# Patient Record
Sex: MALE | Race: BLACK OR AFRICAN AMERICAN | NOT HISPANIC OR LATINO | Employment: OTHER | URBAN - METROPOLITAN AREA
[De-identification: names, ages, dates, MRNs, and addresses within clinical notes are randomized per-mention and may not be internally consistent; named-entity substitution may affect disease eponyms.]

---

## 2017-12-28 ENCOUNTER — HISTORICAL (OUTPATIENT)
Dept: ADMINISTRATIVE | Facility: HOSPITAL | Age: 82
End: 2017-12-28

## 2017-12-28 LAB
BUN SERPL-MCNC: 35 MG/DL (ref 7–18)
CALCIUM SERPL-MCNC: 8.7 MG/DL (ref 8.5–10.1)
CHLORIDE SERPL-SCNC: 108 MMOL/L (ref 98–107)
CO2 SERPL-SCNC: 20 MMOL/L (ref 21–32)
CREAT SERPL-MCNC: 1.85 MG/DL (ref 0.7–1.3)
GLUCOSE SERPL-MCNC: 139 MG/DL (ref 74–106)
POC CREATININE: 1.7 MG/DL (ref 0.6–1.3)
POTASSIUM SERPL-SCNC: 4.3 MMOL/L (ref 3.5–5.1)
SODIUM SERPL-SCNC: 141 MMOL/L (ref 136–145)

## 2018-06-14 ENCOUNTER — HISTORICAL (OUTPATIENT)
Dept: ADMINISTRATIVE | Facility: HOSPITAL | Age: 83
End: 2018-06-14

## 2018-06-14 LAB
ABS NEUT (OLG): 4.69 X10(3)/MCL (ref 2.1–9.2)
BASOPHILS # BLD AUTO: 0 X10(3)/MCL (ref 0–0.2)
BASOPHILS NFR BLD AUTO: 1 %
CRP SERPL HS-MCNC: 3.55 MG/L (ref 0–3)
EOSINOPHIL # BLD AUTO: 0.2 X10(3)/MCL (ref 0–0.9)
EOSINOPHIL NFR BLD AUTO: 2 %
ERYTHROCYTE [DISTWIDTH] IN BLOOD BY AUTOMATED COUNT: 14.6 % (ref 11.5–17)
ERYTHROCYTE [SEDIMENTATION RATE] IN BLOOD: 13 MM/HR (ref 0–15)
HCT VFR BLD AUTO: 37.3 % (ref 42–52)
HGB BLD-MCNC: 12.1 GM/DL (ref 14–18)
LYMPHOCYTES # BLD AUTO: 1.5 X10(3)/MCL (ref 0.6–4.6)
LYMPHOCYTES NFR BLD AUTO: 21 %
MCH RBC QN AUTO: 32.8 PG (ref 27–31)
MCHC RBC AUTO-ENTMCNC: 32.4 GM/DL (ref 33–36)
MCV RBC AUTO: 101.1 FL (ref 80–94)
MONOCYTES # BLD AUTO: 0.7 X10(3)/MCL (ref 0.1–1.3)
MONOCYTES NFR BLD AUTO: 10 %
NEUTROPHILS # BLD AUTO: 4.69 X10(3)/MCL (ref 1.4–7.9)
NEUTROPHILS NFR BLD AUTO: 66 %
PLATELET # BLD AUTO: 131 X10(3)/MCL (ref 130–400)
PMV BLD AUTO: 10.8 FL (ref 9.4–12.4)
RBC # BLD AUTO: 3.69 X10(6)/MCL (ref 4.7–6.1)
WBC # SPEC AUTO: 7.1 X10(3)/MCL (ref 4.5–11.5)

## 2021-05-12 ENCOUNTER — PATIENT MESSAGE (OUTPATIENT)
Dept: RESEARCH | Facility: HOSPITAL | Age: 86
End: 2021-05-12

## 2022-04-11 ENCOUNTER — HISTORICAL (OUTPATIENT)
Dept: ADMINISTRATIVE | Facility: HOSPITAL | Age: 87
End: 2022-04-11

## 2022-04-26 VITALS
DIASTOLIC BLOOD PRESSURE: 83 MMHG | SYSTOLIC BLOOD PRESSURE: 139 MMHG | HEIGHT: 71 IN | WEIGHT: 213.88 LBS | BODY MASS INDEX: 29.94 KG/M2

## 2023-03-23 LAB
CHOLEST SERPL-MSCNC: 126 MG/DL (ref 0–200)
HDLC SERPL-MCNC: 42 MG/DL (ref 35–70)
LDLC SERPL CALC-MCNC: 71 MG/DL
TRIGL SERPL-MCNC: 65 MG/DL (ref 40–160)

## 2023-05-17 LAB
LEFT EYE DM RETINOPATHY: NEGATIVE
RIGHT EYE DM RETINOPATHY: NEGATIVE

## 2023-08-01 DIAGNOSIS — E79.0 HYPERURICEMIA: Primary | ICD-10-CM

## 2023-08-01 RX ORDER — ALLOPURINOL 300 MG/1
TABLET ORAL
Qty: 45 TABLET | Refills: 1 | Status: SHIPPED | OUTPATIENT
Start: 2023-08-01 | End: 2023-09-27 | Stop reason: SDUPTHER

## 2023-08-01 RX ORDER — ALLOPURINOL 300 MG/1
TABLET ORAL
COMMUNITY
Start: 2023-05-01 | End: 2023-08-01 | Stop reason: SDUPTHER

## 2023-08-29 RX ORDER — FOLIC ACID 1 MG/1
1000 TABLET ORAL
COMMUNITY
Start: 2023-05-30 | End: 2023-09-01 | Stop reason: SDUPTHER

## 2023-08-29 RX ORDER — QUINIDINE GLUCONATE 324 MG
27 TABLET, EXTENDED RELEASE ORAL DAILY
COMMUNITY

## 2023-08-29 RX ORDER — LATANOPROST 50 UG/ML
SOLUTION/ DROPS OPHTHALMIC NIGHTLY
COMMUNITY
Start: 2023-08-22

## 2023-08-29 RX ORDER — WITCH HAZEL 50 %
5000 PADS, MEDICATED (EA) TOPICAL DAILY
COMMUNITY

## 2023-08-29 RX ORDER — LISINOPRIL 5 MG/1
5 TABLET ORAL DAILY
COMMUNITY
End: 2023-12-22

## 2023-08-29 RX ORDER — SODIUM BICARBONATE 650 MG/1
650 TABLET ORAL
COMMUNITY
Start: 2023-08-21

## 2023-08-29 RX ORDER — HYDROCHLOROTHIAZIDE 25 MG/1
25 TABLET ORAL
COMMUNITY
Start: 2023-07-24

## 2023-08-29 RX ORDER — CHOLECALCIFEROL (VITAMIN D3) 25 MCG
1000 TABLET ORAL DAILY
COMMUNITY

## 2023-08-29 RX ORDER — ATORVASTATIN CALCIUM 80 MG/1
80 TABLET, FILM COATED ORAL DAILY
COMMUNITY

## 2023-08-29 RX ORDER — APIXABAN 2.5 MG/1
2.5 TABLET, FILM COATED ORAL 2 TIMES DAILY
COMMUNITY
Start: 2023-08-28

## 2023-08-29 RX ORDER — FINASTERIDE 5 MG/1
5 TABLET, FILM COATED ORAL DAILY
COMMUNITY
Start: 2023-08-11

## 2023-08-29 RX ORDER — FUROSEMIDE 40 MG/1
20 TABLET ORAL DAILY
COMMUNITY
Start: 2023-06-20

## 2023-09-01 DIAGNOSIS — D64.9 ANEMIA, UNSPECIFIED TYPE: Primary | ICD-10-CM

## 2023-09-01 RX ORDER — FOLIC ACID 1 MG/1
1 TABLET ORAL DAILY
Qty: 90 TABLET | Refills: 2 | Status: SHIPPED | OUTPATIENT
Start: 2023-09-01 | End: 2023-09-27 | Stop reason: SDUPTHER

## 2023-09-27 ENCOUNTER — OFFICE VISIT (OUTPATIENT)
Dept: FAMILY MEDICINE | Facility: CLINIC | Age: 88
End: 2023-09-27
Payer: MEDICARE

## 2023-09-27 VITALS
SYSTOLIC BLOOD PRESSURE: 126 MMHG | BODY MASS INDEX: 25.06 KG/M2 | RESPIRATION RATE: 20 BRPM | OXYGEN SATURATION: 96 % | DIASTOLIC BLOOD PRESSURE: 76 MMHG | HEART RATE: 68 BPM | HEIGHT: 71 IN | WEIGHT: 179 LBS | TEMPERATURE: 98 F

## 2023-09-27 DIAGNOSIS — E79.0 HYPERURICEMIA: ICD-10-CM

## 2023-09-27 DIAGNOSIS — I10 PRIMARY HYPERTENSION: Primary | ICD-10-CM

## 2023-09-27 DIAGNOSIS — D52.9 ANEMIA DUE TO FOLIC ACID DEFICIENCY, UNSPECIFIED DEFICIENCY TYPE: ICD-10-CM

## 2023-09-27 DIAGNOSIS — Z79.899 ENCOUNTER FOR LONG-TERM (CURRENT) USE OF OTHER MEDICATIONS: ICD-10-CM

## 2023-09-27 DIAGNOSIS — N18.32 STAGE 3B CHRONIC KIDNEY DISEASE: ICD-10-CM

## 2023-09-27 PROBLEM — E55.9 VITAMIN D DEFICIENCY: Status: ACTIVE | Noted: 2023-09-27

## 2023-09-27 PROBLEM — I73.9 PAD (PERIPHERAL ARTERY DISEASE): Status: ACTIVE | Noted: 2023-09-27

## 2023-09-27 PROBLEM — I87.8 VENOUS STASIS: Status: ACTIVE | Noted: 2023-09-27

## 2023-09-27 PROBLEM — C64.9 RENAL CELL CARCINOMA: Status: ACTIVE | Noted: 2023-09-27

## 2023-09-27 PROBLEM — M19.90 DJD (DEGENERATIVE JOINT DISEASE): Status: ACTIVE | Noted: 2023-09-27

## 2023-09-27 PROBLEM — N18.30 CKD (CHRONIC KIDNEY DISEASE) STAGE 3, GFR 30-59 ML/MIN: Status: ACTIVE | Noted: 2023-09-27

## 2023-09-27 PROBLEM — M81.0 OSTEOPOROSIS: Status: ACTIVE | Noted: 2023-09-27

## 2023-09-27 PROBLEM — I25.10 CAD (CORONARY ARTERY DISEASE): Status: ACTIVE | Noted: 2023-09-27

## 2023-09-27 PROBLEM — H26.9 CATARACT: Status: ACTIVE | Noted: 2023-09-27

## 2023-09-27 PROBLEM — I50.9 CHF (CONGESTIVE HEART FAILURE): Status: ACTIVE | Noted: 2023-09-27

## 2023-09-27 PROBLEM — H40.9 GLAUCOMA: Status: ACTIVE | Noted: 2023-09-27

## 2023-09-27 PROBLEM — D64.9 ANEMIA: Status: ACTIVE | Noted: 2023-09-27

## 2023-09-27 PROBLEM — R97.20 ELEVATED PSA: Status: ACTIVE | Noted: 2023-09-27

## 2023-09-27 PROBLEM — Z86.718 HISTORY OF RECURRENT DEEP VEIN THROMBOSIS (DVT): Status: ACTIVE | Noted: 2023-09-27

## 2023-09-27 PROBLEM — Z86.73 HISTORY OF CARDIOEMBOLIC CEREBROVASCULAR ACCIDENT (CVA): Status: ACTIVE | Noted: 2023-09-27

## 2023-09-27 PROBLEM — Z86.711 HISTORY OF PULMONARY EMBOLUS (PE): Status: ACTIVE | Noted: 2023-09-27

## 2023-09-27 PROCEDURE — 99214 PR OFFICE/OUTPT VISIT, EST, LEVL IV, 30-39 MIN: ICD-10-PCS | Mod: ,,, | Performed by: FAMILY MEDICINE

## 2023-09-27 PROCEDURE — 99214 OFFICE O/P EST MOD 30 MIN: CPT | Mod: ,,, | Performed by: FAMILY MEDICINE

## 2023-09-27 RX ORDER — FOLIC ACID 1 MG/1
1 TABLET ORAL DAILY
Qty: 90 TABLET | Refills: 3 | Status: SHIPPED | OUTPATIENT
Start: 2023-09-27

## 2023-09-27 RX ORDER — ALLOPURINOL 300 MG/1
TABLET ORAL
Qty: 45 TABLET | Refills: 3 | Status: SHIPPED | OUTPATIENT
Start: 2023-09-27

## 2023-09-27 NOTE — ASSESSMENT & PLAN NOTE
Patient H&H is stable at this time.  Last CBC done on August 28, 2023 showed an H&H of 11.4 and 34.6 respectively.

## 2023-09-27 NOTE — PROGRESS NOTES
Patient Name: Constantine Gregory Sr.     Patient ID: [id number]     Chief Complaint: Follow-up      HPI:     Constantine Gregory Sr. is a 89 y.o. male here today for follow up of hypertension, anemia and hyperuricemia.    Past Medical History:   Diagnosis Date    Anemia     CAD (coronary artery disease)     Cataract     CHF (congestive heart failure)     CKD (chronic kidney disease) stage 3, GFR 30-59 ml/min     Cystoid macular edema     left eye    DJD (degenerative joint disease)     Elevated PSA     Glaucoma     History of cardioembolic cerebrovascular accident (CVA)     History of pulmonary embolus (PE)     History of recurrent deep vein thrombosis (DVT)     Hypertension     Hyperuricemia     Osteoporosis     PAD (peripheral artery disease)     Renal cell carcinoma     Venous stasis     Vitamin D deficiency         Past Surgical History:   Procedure Laterality Date    CARDIAC VALVE REPLACEMENT      CATARACT EXTRACTION Bilateral     CORONARY ARTERY BYPASS GRAFT      CVA Bleed  1998    EYE SURGERY      GLAUCOMA SURGERY Left     JOINT REPLACEMENT          Social History     Socioeconomic History    Marital status:     Years of education: 12   Tobacco Use    Smoking status: Never    Smokeless tobacco: Never   Substance and Sexual Activity    Alcohol use: No     Alcohol/week: 0.0 standard drinks of alcohol    Drug use: No        Current Outpatient Medications   Medication Instructions    allopurinoL (ZYLOPRIM) 300 MG tablet Take 1/2 tablet by mouth once daily    aspirin (ECOTRIN) 81 mg, Oral, Daily    atorvastatin (LIPITOR) 80 mg, Oral, Daily    CALCIUM CARBONATE/VITAMIN D3 (VITAMIN D-3 ORAL) 1,000 Int'l Units/day, Oral, Daily    cyanocobalamin 5,000 mcg, Oral, Daily    doxazosin (CARDURA) 4 mg, Oral, Nightly    ELIQUIS 2.5 mg, Oral, 2 times daily    ferrous gluconate (FERGON) 27 mg, Oral, Daily    finasteride (PROSCAR) 5 mg, Oral, Daily    folic acid (FOLVITE) 1 mg, Oral, Daily    furosemide (LASIX) 20 mg,  "Oral, Daily    hydroCHLOROthiazide (HYDRODIURIL) 25 mg, Oral    latanoprost 0.005 % ophthalmic solution Nightly    lisinopriL (PRINIVIL,ZESTRIL) 5 mg, Oral, Daily    nitroGLYCERIN (NITROSTAT) 0.4 mg, Sublingual, Every 5 min PRN    sodium bicarbonate 650 mg, Oral    vitamin D (VITAMIN D3) 1,000 Units, Oral, Daily       Review of patient's allergies indicates:   Allergen Reactions    Iodinated contrast media Rash    Asa [aspirin]     Eliquis [apixaban]     Nsaids (non-steroidal anti-inflammatory drug)         Immunization History   Administered Date(s) Administered    COVID-19, MRNA, LN-S, PF (Pfizer) (Gray Cap) 07/12/2022    COVID-19, MRNA, LN-S, PF (Pfizer) (Purple Cap) 01/27/2021, 02/24/2021, 11/16/2021    Pneumococcal Polysaccharide - 23 Valent 09/14/2016       Patient Care Team:  Marlon Alonso Sr., MD as PCP - General (Family Medicine)  Glenn Cameron MD as Consulting Physician (Nephrology)  Derek Singleton MD as Consulting Physician (Cardiology)  Marcio Moreno MD as Consulting Physician (Urology)  Judy Harding DPM as Consulting Physician (Podiatry)  Iker Overton MD as Consulting Physician (Ophthalmology)     Subjective:     Review of Systems    10 point review of systems conducted, negative except as stated in the history of present illness. See HPI for details.    Objective:     Visit Vitals  /76 (BP Location: Left arm, Patient Position: Sitting, BP Method: Medium (Manual))   Pulse 68   Temp 98.2 °F (36.8 °C)   Resp 20   Ht 5' 11" (1.803 m)   Wt 81.2 kg (179 lb)   SpO2 96%   BMI 24.97 kg/m²       Physical Exam  Constitutional:       Appearance: Normal appearance.   HENT:      Head: Normocephalic and atraumatic.   Cardiovascular:      Rate and Rhythm: Normal rate and regular rhythm.   Pulmonary:      Effort: Pulmonary effort is normal.      Breath sounds: Normal breath sounds.   Abdominal:      Palpations: Abdomen is soft.      Tenderness: There is no abdominal tenderness. "   Musculoskeletal:         General: Normal range of motion.      Cervical back: Normal range of motion and neck supple.      Comments: Patient has +1 left lower leg edema and 3+ right lower leg edema.   Lymphadenopathy:      Cervical: No cervical adenopathy.   Skin:     General: Skin is warm and dry.   Neurological:      General: No focal deficit present.      Mental Status: He is alert and oriented to person, place, and time.   Psychiatric:         Mood and Affect: Mood normal.           Assessment:       ICD-10-CM ICD-9-CM   1. Primary hypertension  I10 401.9   2. Stage 3b chronic kidney disease  N18.32 585.3   3. Anemia-multifactorial in origin.  D52.9 281.2   4. Hyperuricemia  E79.0 790.6   5. Encounter for long-term (current) use of other medications  Z79.899 V58.69        Plan:     1. Primary hypertension  Overview:  Continue present med tx lisinopril 5 mg daily furosemide 40 mg daily doxazosin 4 mg daily.    Patient is followed by Cardiology.  Low Sodium Diet (DASH Diet - Less than 2 grams of sodium per day).  Monitor blood pressure daily and log. Report consistent numbers greater than 140/90.  Maintain healthy weight with goal BMI <30. Exercise 30 minutes per day, 5 days per week.    Assessment & Plan:  Patient's blood pressure is well controlled.      2. Stage 3b chronic kidney disease  Overview:  Stable from renal standpoint.  Patient is followed by Nephrology.  Follow renoprotective measures including Renal Diet (reduce intake of nuts, peanut butter, milk, cheese, dried beans, peas) and Low Sodium Diet (less than 2 grams per day).  Avoid NSAIDs (Aleve, Mobic, Celebrex, Ibuprofen, Advil, Toradol and Diclofenac). May take Tylenol as needed for headache/pain.  Control DM with goal A1C <7. BP goal <130/80. LDL goal < 100.  Stay well hydrated. Avoid alcohol and soda. Limit tea and coffee.  His BUN  &  Creat as of August 28, 2023 was 43.25 and 2.03 respectively.    Assessment & Plan:  Continue follow-up with  Nephrology.      3. Anemia-multifactorial in origin.  Comments:  Patient has a history of chronic renal failure,, iron-deficiency and folate deficiency.  He will continue with iron & folate supplementation.  Overview:      Continue with Folvite 1 mg daily & Fergon 1 q.day      Assessment & Plan:  Patient H&H is stable at this time.  Last CBC done on August 28, 2023 showed an H&H of 11.4 and 34.6 respectively.    Orders:  -     folic acid (FOLVITE) 1 MG tablet; Take 1 tablet (1 mg total) by mouth once daily.  Dispense: 90 tablet; Refill: 3    4. Hyperuricemia  Overview:  Continue with Allopurinol 300 mg 1/2 tab daily.  Patient is at goal.  Stay well hydrated by increasing water intake throughout the day.  Stressed importance of exercise and weight loss to maintain BMI <30.  Avoid alcohol, sodas, organ/glandular meats (liver, kidney, sweetbreads). Limit seafood and red meat intake.  Eat a balanced diet of fruits, vegetables, complex carbohydrates, and lean sources of protein (boneless/skinless chicken breasts, salmon, lentils, low fat dairy).  Discussed possible benefit of OTC Vitamin C supplementation.     Assessment & Plan:  Patient's last H&H on August 28, 2023 was 4.6.    Orders:  -     allopurinoL (ZYLOPRIM) 300 MG tablet; Take 1/2 tablet by mouth once daily  Dispense: 45 tablet; Refill: 3    5. Encounter for long-term (current) use of other medications  Overview:  Patient was instructed to continue with the prescribed medications as no adverse or cost issues were found.   Refills were given if needed.  Compliance issues were discussed as far as medications that patient is currently taking.  Discussed the possibility of getting off some medications that were not absolutely necessary.          [x] Discussed lab findings with the patient.  [x] Discussed diet, exercise and if appropriate, weight loss.  [x] Instructions and information, including risks and benefits of prescribed medication(s) have been reviewed with  the patient and patient verbalizes understanding. Questions have been answered to the patient's satisfaction.  [] Appropriate counseling has been given regarding anxiety and depressive issues that were discussed today.  [] Any lab drawn today will be reviewed by physician at the time it is received and appropriate recommendations bill be made and discussed with patient.     Follow up in about 6 months (around 3/27/2024) for Follow Up.   In addition to their scheduled follow up, the patient has also been instructed to follow up on as needed basis.     Future Appointments   Date Time Provider Department Center   3/27/2024  8:00 AM NURSE, [facility] FAMILY MEDICINE [facility]   4/1/2024 10:00 AM Marlon Alonso Sr., MD [facility] Alonso Sr, MD

## 2023-10-17 ENCOUNTER — CLINICAL SUPPORT (OUTPATIENT)
Dept: FAMILY MEDICINE | Facility: CLINIC | Age: 88
End: 2023-10-17
Payer: MEDICARE

## 2023-10-17 DIAGNOSIS — Z23 IMMUNIZATION DUE: Primary | ICD-10-CM

## 2023-10-17 PROCEDURE — 90694 FLU VACCINE - QUADRIVALENT - ADJUVANTED: ICD-10-PCS | Mod: ,,, | Performed by: FAMILY MEDICINE

## 2023-10-17 PROCEDURE — 90694 VACC AIIV4 NO PRSRV 0.5ML IM: CPT | Mod: ,,, | Performed by: FAMILY MEDICINE

## 2023-10-17 PROCEDURE — G0008 FLU VACCINE - QUADRIVALENT - ADJUVANTED: ICD-10-PCS | Mod: ,,, | Performed by: FAMILY MEDICINE

## 2023-10-17 PROCEDURE — G0008 ADMIN INFLUENZA VIRUS VAC: HCPCS | Mod: ,,, | Performed by: FAMILY MEDICINE

## 2023-10-17 NOTE — PROGRESS NOTES
Pt. Reports he is feeling well. Fluad 0.5 cc IM left deltoid administered per pt. Request and verbal order per MD. Injection tolerated well.

## 2023-12-20 ENCOUNTER — TELEPHONE (OUTPATIENT)
Dept: FAMILY MEDICINE | Facility: CLINIC | Age: 88
End: 2023-12-20
Payer: MEDICARE

## 2023-12-20 NOTE — TELEPHONE ENCOUNTER
Received a call from Nehal at [facility] in regards to Mr. Fitch's current medication list as well as past illness or diagnosis we had on file for him.   He was referred to home health by Dr. Fabio Swanson.    She has a few questions about some medications that were changed and that he was completely taken off of. He told her it was Dr. Alonso that took him off and I looked in his chart and no medication changes were made at his last appointment here.    She also had some concerns on his leg swelling and I informed her that is chronic and he has been having the swelling it has been documented on multiple office visits here.    She will be calling Dr. Cameron for a update to see if he made any changes to his medications and if he did documentation of what was changed will be requested.            His blood pressure was 180/100 so she was wanting him to be seen here by Dr. Alonso to reconcile his medications to see if something needs to be added back or changed to control his blood pressure.  I will be sending this to Dr. Alonso with his medication list.

## 2023-12-21 NOTE — TELEPHONE ENCOUNTER
Spoke with [facility] in regards to Mr. Fitch and asked them to fax over a medication list. As well as asking them to go out to the home and take another blood pressure on him and call us to let us know what the repeat blood pressure was.

## 2023-12-22 ENCOUNTER — TELEPHONE (OUTPATIENT)
Dept: FAMILY MEDICINE | Facility: CLINIC | Age: 88
End: 2023-12-22
Payer: MEDICARE

## 2023-12-22 DIAGNOSIS — I10 PRIMARY HYPERTENSION: Primary | ICD-10-CM

## 2023-12-22 RX ORDER — LISINOPRIL 20 MG/1
20 TABLET ORAL DAILY
Qty: 90 TABLET | Refills: 1 | Status: SHIPPED | OUTPATIENT
Start: 2023-12-22

## 2023-12-22 NOTE — TELEPHONE ENCOUNTER
Nurse Cazares called from  [facility] she called reporting patients b/p  172/98. With 3+ edema. Dr Alonso notified and new orders Start on Lisinopril 20 mg 1 tablet by mouth daily and  continue with Cardura 4 mg 1 daily and Furosemide 20 mg I q am, a new Rx for Lisinopril will be faxed in to [facility] and New order for Home Health to go out on 12/26/23 and 12/28/23 to recheck patients b/p and call office with readings.

## 2023-12-28 ENCOUNTER — TELEPHONE (OUTPATIENT)
Dept: FAMILY MEDICINE | Facility: CLINIC | Age: 88
End: 2023-12-28
Payer: MEDICARE

## 2023-12-28 NOTE — TELEPHONE ENCOUNTER
Spoke with Shereen with [facility] and she will request b/p readings from PT on 12/26/23 visit and nurse is going out today for a visit.

## 2024-01-23 ENCOUNTER — DOCUMENT SCAN (OUTPATIENT)
Dept: HOME HEALTH SERVICES | Facility: HOSPITAL | Age: 89
End: 2024-01-23
Payer: MEDICARE

## 2024-01-24 ENCOUNTER — DOCUMENT SCAN (OUTPATIENT)
Dept: HOME HEALTH SERVICES | Facility: HOSPITAL | Age: 89
End: 2024-01-24
Payer: MEDICARE

## 2024-03-18 ENCOUNTER — DOCUMENT SCAN (OUTPATIENT)
Dept: HOME HEALTH SERVICES | Facility: HOSPITAL | Age: 89
End: 2024-03-18
Payer: MEDICARE

## 2024-04-02 PROCEDURE — 82728 ASSAY OF FERRITIN: CPT | Performed by: FAMILY MEDICINE

## 2024-04-02 PROCEDURE — 82746 ASSAY OF FOLIC ACID SERUM: CPT | Performed by: FAMILY MEDICINE

## 2024-04-02 PROCEDURE — 85045 AUTOMATED RETICULOCYTE COUNT: CPT | Performed by: FAMILY MEDICINE

## 2024-04-02 PROCEDURE — 84550 ASSAY OF BLOOD/URIC ACID: CPT | Performed by: FAMILY MEDICINE

## 2024-04-02 PROCEDURE — 83540 ASSAY OF IRON: CPT | Performed by: FAMILY MEDICINE

## 2024-04-02 PROCEDURE — 82607 VITAMIN B-12: CPT | Performed by: FAMILY MEDICINE

## 2024-04-02 PROCEDURE — 85025 COMPLETE CBC W/AUTO DIFF WBC: CPT | Performed by: FAMILY MEDICINE

## 2024-04-08 ENCOUNTER — OFFICE VISIT (OUTPATIENT)
Dept: FAMILY MEDICINE | Facility: CLINIC | Age: 89
End: 2024-04-08
Payer: MEDICARE

## 2024-04-08 VITALS
DIASTOLIC BLOOD PRESSURE: 88 MMHG | RESPIRATION RATE: 20 BRPM | SYSTOLIC BLOOD PRESSURE: 168 MMHG | OXYGEN SATURATION: 95 % | WEIGHT: 176.19 LBS | HEART RATE: 60 BPM | BODY MASS INDEX: 24.67 KG/M2 | HEIGHT: 71 IN | TEMPERATURE: 97 F

## 2024-04-08 DIAGNOSIS — E78.2 MIXED HYPERLIPIDEMIA: ICD-10-CM

## 2024-04-08 DIAGNOSIS — I10 PRIMARY HYPERTENSION: Primary | ICD-10-CM

## 2024-04-08 DIAGNOSIS — E79.0 HYPERURICEMIA: ICD-10-CM

## 2024-04-08 DIAGNOSIS — I87.8 VENOUS STASIS: ICD-10-CM

## 2024-04-08 DIAGNOSIS — D52.9 ANEMIA DUE TO FOLIC ACID DEFICIENCY, UNSPECIFIED DEFICIENCY TYPE: ICD-10-CM

## 2024-04-08 PROCEDURE — 99214 OFFICE O/P EST MOD 30 MIN: CPT | Mod: ,,, | Performed by: FAMILY MEDICINE

## 2024-04-08 RX ORDER — FERROUS SULFATE 137(45) MG
137 TABLET, EXTENDED RELEASE ORAL DAILY
COMMUNITY

## 2024-04-08 RX ORDER — FOLIC ACID 1 MG/1
1 TABLET ORAL DAILY
Qty: 90 TABLET | Refills: 3 | Status: SHIPPED | OUTPATIENT
Start: 2024-04-08

## 2024-04-08 NOTE — PROGRESS NOTES
Patient Name: Constantine Gregory Sr.     Patient ID: [id number]     Chief Complaint: Results (Patient here for lab results.)      HPI:     Constantine Gregory Sr. is a 89 y.o. male here today for follow up   hypertension, anemia and lab results.  Past Medical History:   Diagnosis Date    Anemia     CAD (coronary artery disease)     Cataract     CHF (congestive heart failure)     CKD (chronic kidney disease) stage 3, GFR 30-59 ml/min     Cystoid macular edema     left eye    DJD (degenerative joint disease)     Elevated PSA     Glaucoma     History of cardioembolic cerebrovascular accident (CVA)     History of pulmonary embolus (PE)     History of recurrent deep vein thrombosis (DVT)     Hypertension     Hyperuricemia     Osteoporosis     PAD (peripheral artery disease)     Renal cell carcinoma     Venous stasis     Vitamin D deficiency         Past Surgical History:   Procedure Laterality Date    CARDIAC VALVE REPLACEMENT      CATARACT EXTRACTION Bilateral     CORONARY ARTERY BYPASS GRAFT      CVA Bleed  1998    EYE SURGERY      GLAUCOMA SURGERY Left     JOINT REPLACEMENT          Social History     Socioeconomic History    Marital status:     Number of children: 4    Years of education: 12   Tobacco Use    Smoking status: Never    Smokeless tobacco: Never   Substance and Sexual Activity    Alcohol use: No     Alcohol/week: 0.0 standard drinks of alcohol    Drug use: No        Current Outpatient Medications   Medication Instructions    allopurinoL (ZYLOPRIM) 300 MG tablet Take 1/2 tablet by mouth once daily    aspirin (ECOTRIN) 81 mg, Oral, Daily    atorvastatin (LIPITOR) 80 mg, Oral, Daily    CALCIUM CARBONATE/VITAMIN D3 (VITAMIN D-3 ORAL) 1,000 Int'l Units/day, Oral, Daily    cyanocobalamin 5,000 mcg, Oral, Daily    doxazosin (CARDURA) 4 mg, Oral, Nightly    ELIQUIS 2.5 mg, Oral, 2 times daily    finasteride (PROSCAR) 5 mg, Oral, Daily    folic acid (FOLVITE) 1 mg, Oral, Daily    furosemide (LASIX) 20 mg,  "Oral, Daily    hydroCHLOROthiazide (HYDRODIURIL) 25 mg    latanoprost 0.005 % ophthalmic solution Nightly    lisinopriL (PRINIVIL,ZESTRIL) 20 mg, Oral, Daily    nitroGLYCERIN (NITROSTAT) 0.4 mg, Sublingual, Every 5 min PRN    SLOW  mg, Oral, Daily    sodium bicarbonate 650 mg, Oral, Daily, Rx for 10 gram daily    vitamin D (VITAMIN D3) 1,000 Units, Oral, Daily       Review of patient's allergies indicates:   Allergen Reactions    Iodinated contrast media Rash    Asa [aspirin]     Eliquis [apixaban]     Nsaids (non-steroidal anti-inflammatory drug)         Immunization History   Administered Date(s) Administered    COVID-19, MRNA, LN-S, PF (Pfizer) (Gray Cap) 07/12/2022    COVID-19, MRNA, LN-S, PF (Pfizer) (Purple Cap) 01/27/2021, 02/24/2021, 11/16/2021    Influenza (FLUAD) - Quadrivalent - Adjuvanted - PF *Preferred* (65+) 10/17/2023    Pneumococcal Polysaccharide - 23 Valent 09/14/2016       Patient Care Team:  Marlon Alonso Sr., MD as PCP - General (Family Medicine)  Glenn Cameron MD as Consulting Physician (Nephrology)  Derek Singleton MD as Consulting Physician (Cardiology)  Marcio Moreno MD as Consulting Physician (Urology)  Judy Harding DPM as Consulting Physician (Podiatry)  Iker Overton MD as Consulting Physician (Ophthalmology)     Subjective:     Review of Systems    10 point review of systems conducted, negative except as stated in the history of present illness. See HPI for details.    Objective:     Visit Vitals  BP (!) 168/88 (BP Location: Right arm, Patient Position: Sitting, BP Method: Medium (Manual))   Pulse 60   Temp 97.2 °F (36.2 °C)   Resp 20   Ht 5' 11" (1.803 m)   Wt 79.9 kg (176 lb 3.2 oz)   SpO2 95%   BMI 24.57 kg/m²       Physical Exam  Constitutional:       Appearance: Normal appearance.   HENT:      Head: Normocephalic and atraumatic.   Cardiovascular:      Rate and Rhythm: Normal rate and regular rhythm.   Pulmonary:      Effort: Pulmonary effort is " normal.      Breath sounds: Normal breath sounds.   Abdominal:      Palpations: Abdomen is soft.      Tenderness: There is no abdominal tenderness.   Musculoskeletal:         General: Normal range of motion.      Cervical back: Normal range of motion and neck supple.      Comments: There is 2+ right lower leg edema and 1+ left lower leg edema   Lymphadenopathy:      Cervical: No cervical adenopathy.   Skin:     General: Skin is warm and dry.   Neurological:      General: No focal deficit present.      Mental Status: He is alert and oriented to person, place, and time.   Psychiatric:         Mood and Affect: Mood normal.         Assessment:       ICD-10-CM ICD-9-CM   1. Primary hypertension  I10 401.9   2. Anemia-multifactorial in origin.  D52.9 281.2   3. Venous stasis  I87.8 459.81   4. Hyperuricemia  E79.0 790.6       Plan:   1. Primary hypertension  Overview:  Current med tx: lisinopril 5 mg daily furosemide 40 mg daily doxazosin 4 mg daily.    Low Sodium Diet (DASH Diet - Less than 2 grams of sodium per day).  Monitor blood pressure daily and log. Report consistent numbers greater than 140/90.  Maintain healthy weight with goal BMI <30. Exercise 30 minutes per day, 5 days per week.    Assessment & Plan:  Pt. Is monitored by cardiology. He will continue all current medications. He did not take his blood pressure medication this morning.      2. Anemia-multifactorial in origin.  Comments:  Patient has a history of chronic renal failure,, iron-deficiency and folate deficiency.  He will continue with iron & folate supplementation.  Overview:  Current medication: Folvite 1 mg daily & Fergon 1 q.day      Assessment & Plan:  Patient's most recent H&H was 12.1 and 36.8 respectively.  His serum iron was depressed at 36 & his ferritin level was 71.43.  Folic acid level was 16.5.  His anemia has been stable.  Patient was given instructions to change iron  preparation to Slow Fe 1q day.      3. Venous  stasis  Overview:  Patient was instructed to maintain a low-sodium diet and drink plenty of water.  Elevate legs p.r.n. and wear compression stockings or socks.  Take diuretic as directed. Patient was instructed to notify physician if swelling becomes unmanageable.     Assessment & Plan:  Patient has 2+ lower leg edema on the right and 1+ on the left.  He is wearing compression stockings to both lower extremities.      4. Hyperuricemia  Overview:  Continue with Allopurinol 300 mg 1/2 tab daily.    Stay well hydrated by increasing water intake throughout the day.  Stressed importance of exercise and weight loss to maintain BMI <30.  Avoid alcohol, sodas, organ/glandular meats (liver, kidney, sweetbreads). Limit seafood and red meat intake.  Eat a balanced diet of fruits, vegetables, complex carbohydrates, and lean sources of protein (boneless/skinless chicken breasts, salmon, lentils, low fat dairy).  Discussed possible benefit of OTC Vitamin C supplementation.     Assessment & Plan:  Patient's most recent uric acid level was 4.4 on 04/02/2024.  Patient's hyperuricemia is well controlled and at goal.          [x] Discussed lab findings with the patient.  [x] Discussed diet, exercise and if appropriate, weight loss.  [x] Instructions and information, including risks and benefits of prescribed medication(s) have been reviewed with the patient and patient verbalizes understanding. Questions have been answered to the patient's satisfaction.  [] Appropriate counseling has been given regarding anxiety and depressive issues that were discussed today.  [] Any lab drawn today will be reviewed by physician at the time it is received and appropriate recommendations bill be made and discussed with patient.     Follow up in about 6 months (around 10/8/2024) for With Fasting Labs prior to visit.   In addition to their scheduled follow up, the patient has also been instructed to follow up on as needed basis.     Future Appointments    Date Time Provider Department Center   10/8/2024  8:00 AM LAB, [facility]   10/10/2024  8:30 AM Marlon Alonso Sr., MD [facility] Alonso Sr, MD

## 2024-04-08 NOTE — ASSESSMENT & PLAN NOTE
Patient has 2+ lower leg edema on the right and 1+ on the left.  He is wearing compression stockings to both lower extremities.

## 2024-04-08 NOTE — ASSESSMENT & PLAN NOTE
Pt. Is monitored by cardiology. He will continue all current medications. He did not take his blood pressure medication this morning.

## 2024-04-08 NOTE — ASSESSMENT & PLAN NOTE
Patient's most recent uric acid level was 4.4 on 04/02/2024.  Patient's hyperuricemia is well controlled and at goal.

## 2024-04-08 NOTE — LETTER
AUTHORIZATION FOR RELEASE OF   CONFIDENTIAL INFORMATION    Dear Lorene,    We are seeing Constantine Gregory Sr., date of birth 5/15/1934, in the clinic at [facility]. Marlon Alonso Sr., MD is the patient's PCP. Constantine Gregory Sr. has an outstanding lab/procedure at the time we reviewed his chart. In order to help keep his health information updated, he has authorized us to request the following medical record(s):        (  )  MAMMOGRAM                                      (  )  COLONOSCOPY      (  )  PAP SMEAR                                          (  )  OUTSIDE LAB RESULTS     (  )  DEXA SCAN                                          (  )  EYE EXAM            (  )  FOOT EXAM                                          (  )  ENTIRE RECORD     (  )  OUTSIDE IMMUNIZATIONS                 (  X)  Last progress note_______________         Please fax records to [facility], Bourgeois, Leonard Jb. Sr., MD, [phone]     If you have any questions, please contact Bethany  at [phone]        Patient Name: Constantine Gregory SrStuart  : 5/15/1934  Patient Phone #: [phone]

## 2024-04-08 NOTE — ASSESSMENT & PLAN NOTE
Patient's most recent H&H was 12.1 and 36.8 respectively.  His serum iron was depressed at 36 & his ferritin level was 71.43.  Folic acid level was 16.5.  His anemia has been stable.  Patient was given instructions to change iron  preparation to Slow Fe 1q day.

## 2024-04-08 NOTE — LETTER
AUTHORIZATION FOR RELEASE OF   CONFIDENTIAL INFORMATION    Dear Dr Singleton,    We are seeing Constantine Gregory Sr., date of birth 5/15/1934, in the clinic at [facility]. Marlon Alonso Sr., MD is the patient's PCP. Constantine Gregory Sr. has an outstanding lab/procedure at the time we reviewed his chart. In order to help keep his health information updated, he has authorized us to request the following medical record(s):        (  )  MAMMOGRAM                                      (  )  COLONOSCOPY      (  )  PAP SMEAR                                          (  )  OUTSIDE LAB RESULTS     (  )  DEXA SCAN                                          (  )  EYE EXAM            (  )  FOOT EXAM                                          (  )  Entire chart      (X) Last progress note     Please fax records to [facility], Bourgeois, Leonard Jb. Sr., MD, [phone]     If you have any questions, please contact Bethany at [phone]          Patient Name: Constantine Gregory SrStuart  : 5/15/1934  Patient Phone #: [phone]

## 2024-07-08 NOTE — PROGRESS NOTES
"Subjective:      Patient here for follow-up of elevated blood pressure.  He is not exercising and is adherent to a low-salt diet.  Blood pressure is not well controlled at home. Cardiac symptoms:  none noted presently . Patient denies: chest pain, chest pressure/discomfort, and palpitations. Cardiovascular risk factors: advanced age (older than 55 for men, 65 for women), hypertension, and male gender. Use of agents associated with hypertension: none. History of target organ damage: chronic kidney disease and heart failure.  Patient was seen at [facility] on 07/05/2024.  Patient was seen for dizziness.  Patient was instructed by provider Luis Cedillo to take half of the lisinopril at night and that the patient was currently on and to follow-up with Dr. Thao and 3 days.    Patient denies SOB, chest pain, fever or chills.        Review of Systems  Pertinent items are noted in HPI.        Objective:      /70   Pulse 60 Comment: regular  Temp 97.8 °F (36.6 °C)   Resp 20   Ht 5' 11" (1.803 m)   Wt 86.2 kg (190 lb)   SpO2 100%   BMI 26.50 kg/m²   General appearance: alert, appears stated age, and cooperative  Head: Normocephalic, without obvious abnormality, atraumatic  Nose: Nares normal. Septum midline. Mucosa normal. No drainage or sinus tenderness.  Throat: lips, mucosa, and tongue normal; teeth and gums normal  Lungs: clear to auscultation bilaterally  Chest wall: no tenderness  Heart: regular rate and rhythm, S1, S2 normal, no murmur, click, rub or gallop  Extremities: edema bilateral edema noted.  Patient states the edema is is from removal of varicose veins years ago.  Pulses: 2+ and symmetric  Skin: Skin color, texture, turgor normal. No rashes or lesions      Assessment:      Hypertension, normal blood pressure 130/70. Evidence of target organ damage: chronic kidney disease and heart failure.      Plan:      Medication: increase to continue taking lisinopril 20 mg in the morning and " lisinopril 10 mg in the evening.  Patient verbalized understanding.  Dietary sodium restriction.  Check blood pressures daily and record.     I spent a total of 30 minutes on the day of the visit.  This includes face to face time and non-face to face time preparing to see the patient (eg, review of tests), obtaining and/or reviewing separately obtained history, documenting clinical information in the electronic or other health record, independently interpreting results and communicating results to the patient/family/caregiver, or care coordinator.

## 2024-07-09 ENCOUNTER — OFFICE VISIT (OUTPATIENT)
Dept: FAMILY MEDICINE | Facility: CLINIC | Age: 89
End: 2024-07-09
Payer: MEDICARE

## 2024-07-09 VITALS
DIASTOLIC BLOOD PRESSURE: 70 MMHG | HEART RATE: 60 BPM | TEMPERATURE: 98 F | RESPIRATION RATE: 20 BRPM | WEIGHT: 190 LBS | HEIGHT: 71 IN | OXYGEN SATURATION: 100 % | SYSTOLIC BLOOD PRESSURE: 130 MMHG | BODY MASS INDEX: 26.6 KG/M2

## 2024-07-09 DIAGNOSIS — I10 PRIMARY HYPERTENSION: ICD-10-CM

## 2024-07-09 DIAGNOSIS — Z71.2 ENCOUNTER TO DISCUSS TEST RESULTS: Primary | ICD-10-CM

## 2024-07-09 PROCEDURE — 99214 OFFICE O/P EST MOD 30 MIN: CPT | Mod: ,,, | Performed by: NURSE PRACTITIONER

## 2024-07-09 NOTE — LETTER
AUTHORIZATION FOR RELEASE OF   CONFIDENTIAL INFORMATION    Dear Dr Singleton,    We are seeing Constantine Gregory Sr., date of birth 5/15/1934, in the clinic at [facility]. Marlon Alonso Sr., MD is the patient's PCP. Constantine Gregory Sr. has an outstanding lab/procedure at the time we reviewed his chart. In order to help keep his health information updated, he has authorized us to request the following medical record(s):        (  )  MAMMOGRAM                                      (  )  COLONOSCOPY      (  )  PAP SMEAR                                          (  )  OUTSIDE LAB RESULTS     (  )  DEXA SCAN                                          (  )  EYE EXAM            (  )  FOOT EXAM                                          (  )  ENTIRE RECORD     (  )  OUTSIDE IMMUNIZATIONS                 (  X)  Last progress note_______________         Please fax records to [facility], Bourgeois, Leonard Jb. Sr., MD, [phone]     If you have any questions, please contact Bethany at [phone]          Patient Name: Constantine Gregory SrStuart  : 5/15/1934  Patient Phone #: [phone]

## 2024-10-04 DIAGNOSIS — E55.9 VITAMIN D DEFICIENCY: Primary | ICD-10-CM

## 2024-10-04 DIAGNOSIS — I10 PRIMARY HYPERTENSION: ICD-10-CM

## 2024-10-04 DIAGNOSIS — Z79.899 ENCOUNTER FOR LONG-TERM (CURRENT) USE OF MEDICATIONS: ICD-10-CM

## 2024-10-04 DIAGNOSIS — E78.2 MIXED HYPERLIPIDEMIA: ICD-10-CM

## 2024-10-04 DIAGNOSIS — D52.9 ANEMIA DUE TO FOLIC ACID DEFICIENCY, UNSPECIFIED DEFICIENCY TYPE: ICD-10-CM

## 2024-10-07 ENCOUNTER — DOCUMENTATION ONLY (OUTPATIENT)
Dept: FAMILY MEDICINE | Facility: CLINIC | Age: 89
End: 2024-10-07
Payer: MEDICARE

## 2024-10-09 ENCOUNTER — OFFICE VISIT (OUTPATIENT)
Dept: FAMILY MEDICINE | Facility: CLINIC | Age: 89
End: 2024-10-09
Payer: MEDICARE

## 2024-10-09 VITALS
TEMPERATURE: 99 F | DIASTOLIC BLOOD PRESSURE: 65 MMHG | HEIGHT: 71 IN | HEART RATE: 58 BPM | RESPIRATION RATE: 18 BRPM | WEIGHT: 173.81 LBS | BODY MASS INDEX: 24.33 KG/M2 | SYSTOLIC BLOOD PRESSURE: 111 MMHG | OXYGEN SATURATION: 96 %

## 2024-10-09 DIAGNOSIS — E55.9 VITAMIN D DEFICIENCY: ICD-10-CM

## 2024-10-09 DIAGNOSIS — D69.6 LOW PLATELET COUNT: ICD-10-CM

## 2024-10-09 DIAGNOSIS — E78.2 MIXED HYPERLIPIDEMIA: ICD-10-CM

## 2024-10-09 DIAGNOSIS — C64.9 RENAL CELL CARCINOMA, UNSPECIFIED LATERALITY: ICD-10-CM

## 2024-10-09 DIAGNOSIS — D52.9 ANEMIA DUE TO FOLIC ACID DEFICIENCY, UNSPECIFIED DEFICIENCY TYPE: Primary | ICD-10-CM

## 2024-10-09 DIAGNOSIS — N18.32 STAGE 3B CHRONIC KIDNEY DISEASE: ICD-10-CM

## 2024-10-09 DIAGNOSIS — I10 PRIMARY HYPERTENSION: ICD-10-CM

## 2024-10-09 DIAGNOSIS — I73.9 PAD (PERIPHERAL ARTERY DISEASE): ICD-10-CM

## 2024-10-09 DIAGNOSIS — I50.9 CONGESTIVE HEART FAILURE, UNSPECIFIED HF CHRONICITY, UNSPECIFIED HEART FAILURE TYPE: ICD-10-CM

## 2024-10-09 RX ORDER — CHLORTHALIDONE 25 MG/1
25 TABLET ORAL
COMMUNITY
Start: 2024-09-16

## 2024-10-09 NOTE — ASSESSMENT & PLAN NOTE
Repeat CMP 3 mos.  Followed by Nephrology.      Follow renoprotective measures including Renal Diet (reduce intake of nuts, peanut butter, milk, cheese, dried beans, peas) and Low Sodium Diet (less than 2 grams per day).  Avoid NSAIDs (Aleve, Mobic, Celebrex, Ibuprofen, Advil, Toradol and Diclofenac). May take Tylenol as needed for headache/pain.  Stay well hydrated. Avoid alcohol and soda. Limit tea and coffee.

## 2024-10-09 NOTE — ASSESSMENT & PLAN NOTE
Plan:  Pt refused labs offered today: Peripheral Smear, B12, Folate    Will repeat CBC 3 mos, and add Peripheral Smear, B12, Folate.  Continue Folic Acid 1 mg PO qd.  Consider more intensive dosing.

## 2024-10-09 NOTE — ASSESSMENT & PLAN NOTE
HTN controlled with current meds.  Pt is monitored by cardiology.  Continue meds.    Low Sodium Diet (DASH Diet - Less than 2 grams of sodium per day).  Monitor blood pressure daily and log. Report consistent numbers greater than 140/90.  Maintain healthy weight with goal BMI <30. Exercise 30 minutes per day, 5 days per week.

## 2024-10-09 NOTE — PROGRESS NOTES
Family Medicine Clinic      Patient ID: [id number]     Chief Complaint: Medicare Annual Wellness     HPI:     Constantine Gregory Sr. is a 89 y.o. male here today for a Medicare Annual Wellness visit and comprehensive Health Risk Assessment.   Pt has Right Lower Extremity swelling, which he reports as being chronic (+10yrs); since having been treated by Cardiology for PAD. Denies pain/tenderness.      Health Maintenance         Date Due Completion Date    TETANUS VACCINE Never done ---    Shingles Vaccine (1 of 2) Never done ---    RSV Vaccine (Age 60+ and Pregnant patients) (1 - 1-dose 75+ series) Never done ---    Pneumococcal Vaccines (Age 65+) (2 of 2 - PCV) 09/14/2017 9/14/2016    COVID-19 Vaccine (5 - 2024-25 season) 09/01/2024 7/12/2022    Lipid Panel 03/23/2028 3/23/2023    Override on 3/10/2015: Done (Done @ Dr Dorsey in [place])             Past Medical History:   Diagnosis Date    Anemia     CAD (coronary artery disease)     Cataract     CHF (congestive heart failure)     CKD (chronic kidney disease) stage 3, GFR 30-59 ml/min     Cystoid macular edema     left eye    DJD (degenerative joint disease)     Elevated PSA     Glaucoma     History of cardioembolic cerebrovascular accident (CVA)     History of pulmonary embolus (PE)     History of recurrent deep vein thrombosis (DVT)     Hypertension     Hyperuricemia     Osteoporosis     PAD (peripheral artery disease)     Renal cell carcinoma     Venous stasis     Vitamin D deficiency         Past Surgical History:   Procedure Laterality Date    CARDIAC VALVE REPLACEMENT      CATARACT EXTRACTION Bilateral     CORONARY ARTERY BYPASS GRAFT      CVA Bleed  1998    EYE SURGERY      GLAUCOMA SURGERY Left     JOINT REPLACEMENT          Social History     Socioeconomic History    Marital status:     Number of children: 4    Years of education: 12   Tobacco Use    Smoking status: Never    Smokeless tobacco: Never   Substance and Sexual Activity    Alcohol  use: No     Alcohol/week: 0.0 standard drinks of alcohol    Drug use: No    Sexual activity: Not Currently     Social Drivers of Health     Food Insecurity: No Food Insecurity (10/9/2024)    Hunger Vital Sign     Worried About Running Out of Food in the Last Year: Never true     Ran Out of Food in the Last Year: Never true   Transportation Needs: No Transportation Needs (10/9/2024)    TRANSPORTATION NEEDS     Transportation : No   Physical Activity: Inactive (10/9/2024)    Exercise Vital Sign     Days of Exercise per Week: 0 days     Minutes of Exercise per Session: 0 min   Stress: No Stress Concern Present (10/9/2024)    [employer] - Occupational Stress Questionnaire     Feeling of Stress : Not at all   Housing Stability: Low Risk  (10/9/2024)    Housing Stability Vital Sign     Unable to Pay for Housing in the Last Year: No     Homeless in the Last Year: No        Family History   Problem Relation Name Age of Onset    Hypertension Mother      Diabetes Mother      Heart disease Father          Current Outpatient Medications   Medication Instructions    allopurinoL (ZYLOPRIM) 300 MG tablet Take 1/2 tablet by mouth once daily    aspirin (ECOTRIN) 81 mg, Daily    atorvastatin (LIPITOR) 80 mg, Daily    chlorthalidone (HYGROTEN) 25 mg, Three times weekly    cyanocobalamin 5,000 mcg, Daily    doxazosin (CARDURA) 4 mg, Nightly    ELIQUIS 2.5 mg, 2 times daily    finasteride (PROSCAR) 5 mg, Daily    folic acid (FOLVITE) 1 mg, Oral, Daily    furosemide (LASIX) 20 mg, Daily    latanoprost 0.005 % ophthalmic solution Nightly    lisinopriL (PRINIVIL,ZESTRIL) 20 mg, Oral, Daily    nitroGLYCERIN (NITROSTAT) 0.4 mg, Every 5 min PRN    SLOW  mg, Daily    sodium bicarbonate 650 mg, Daily    vitamin D (VITAMIN D3) 1,000 Units, Daily       Review of patient's allergies indicates:   Allergen Reactions    Iodinated contrast media Rash    Nsaids (non-steroidal anti-inflammatory drug)      "    Immunization History   Administered Date(s) Administered    COVID-19, MRNA, LN-S, PF (Pfizer) (Gray Cap) 07/12/2022    COVID-19, MRNA, LN-S, PF (Pfizer) (Purple Cap) 01/27/2021, 02/24/2021, 11/16/2021    Influenza (FLUAD) - Quadrivalent - Adjuvanted - PF *Preferred* (65+) 10/17/2023    Pneumococcal Polysaccharide - 23 Valent 09/14/2016        Patient Care Team:  Marlon Alonso Sr., MD as PCP - General (Family Medicine)  Glenn Cameron MD as Consulting Physician (Nephrology)  Derek Singleton MD as Consulting Physician (Cardiology)  Marcio Moreno MD as Consulting Physician (Urology)  Judy Harding DPM as Consulting Physician (Podiatry)  Iker Overton MD as Consulting Physician (Ophthalmology)    Subjective:     Review of Systems    12 point review of systems conducted, negative except as stated in the history of present illness. See HPI for details.    Objective:     Visit Vitals  /65 (BP Location: Right arm, Patient Position: Sitting)   Pulse (!) 58   Temp 98.6 °F (37 °C)   Resp 18   Ht 5' 11" (1.803 m)   Wt 78.8 kg (173 lb 12.8 oz)   SpO2 96%   BMI 24.24 kg/m²       Physical Exam  Vitals and nursing note reviewed.   Constitutional:       General: He is not in acute distress.     Appearance: He is not ill-appearing.   HENT:      Head: Normocephalic and atraumatic.      Mouth/Throat:      Mouth: Mucous membranes are moist.      Pharynx: Oropharynx is clear.   Eyes:      General: No scleral icterus.     Extraocular Movements: Extraocular movements intact.      Conjunctiva/sclera: Conjunctivae normal.      Pupils: Pupils are equal, round, and reactive to light.   Neck:      Vascular: No carotid bruit.   Cardiovascular:      Rate and Rhythm: Normal rate and regular rhythm.      Heart sounds: No murmur heard.     No friction rub. No gallop.   Pulmonary:      Effort: Pulmonary effort is normal. No respiratory distress.      Breath sounds: Normal breath sounds. No wheezing, rhonchi or " rales.   Abdominal:      General: Abdomen is flat. Bowel sounds are normal. There is no distension.      Palpations: Abdomen is soft. There is no mass.      Tenderness: There is no abdominal tenderness.   Musculoskeletal:         General: Normal range of motion.      Cervical back: Normal range of motion and neck supple.   Skin:     General: Skin is warm and dry.      Comments: Right Lower Extremity Edema, +1.  No erythema.  No pain/tenderness.  Negative Patrick's Sign.   Neurological:      General: No focal deficit present.      Mental Status: He is alert.   Psychiatric:         Mood and Affect: Mood normal.         Assessment:       ICD-10-CM ICD-9-CM   1. 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 Primary hypertension  I10 401.9   3. Stage 3b chronic kidney disease  N18.32 585.3   4. Low platelet count  D69.6 287.5   5. Mixed hyperlipidemia  E78.2 272.2   6. Vitamin D deficiency  E55.9 268.9   7. Renal cell carcinoma, unspecified laterality  C64.9 189.0   8. Congestive heart failure, unspecified HF chronicity, unspecified heart failure type  I50.9 428.0   9. PAD (peripheral artery disease)  I73.9 443.9        Plan:     1. 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       Assessment & Plan:  Plan:  Pt refused labs offered today: Peripheral Smear, B12, Folate    Will repeat CBC 3 mos, and add Peripheral Smear, B12, Folate.  Continue Folic Acid 1 mg PO qd.  Consider more intensive dosing.    Orders:  -     Path Review, Peripheral Smear; Future; Expected date: 01/09/2025  -     Folate; Future; Expected date: 01/09/2025  -     Vitamin B12; Future; Expected date: 01/09/2025  -     CBC Auto Differential; Future; Expected date: 01/09/2025    2. Primary hypertension  Overview:  Current med tx: lisinopril 5 mg daily furosemide 40 mg daily doxazosin 4 mg  daily.        Assessment & Plan:  HTN controlled with current meds.  Pt is monitored by cardiology.  Continue meds.    Low Sodium Diet (DASH Diet - Less than 2 grams of sodium per day).  Monitor blood pressure daily and log. Report consistent numbers greater than 140/90.  Maintain healthy weight with goal BMI <30. Exercise 30 minutes per day, 5 days per week.             3. Stage 3b chronic kidney disease  Overview:  Stable GFR 42.      Assessment & Plan:  Repeat CMP 3 mos.  Followed by Nephrology.      Follow renoprotective measures including Renal Diet (reduce intake of nuts, peanut butter, milk, cheese, dried beans, peas) and Low Sodium Diet (less than 2 grams per day).  Avoid NSAIDs (Aleve, Mobic, Celebrex, Ibuprofen, Advil, Toradol and Diclofenac). May take Tylenol as needed for headache/pain.  Stay well hydrated. Avoid alcohol and soda. Limit tea and coffee.      Orders:  -     Comprehensive Metabolic Panel; Future; Expected date: 01/09/2025    4. Low platelet count  Comments:  Pt refused labs today; will repeat labs 3 mos. No bleeding/bruising.    5. Mixed hyperlipidemia  Overview:  Stressed importance of dietary modifications. Follow a low cholesterol, low saturated fat diet with less that 200mg of cholesterol a day.  Avoid fried foods and high saturated fats (high saturated fats less than 7% of calories).  Add Flax Seed/Fish Oil supplements to diet. Increase dietary fiber.  Regular exercise can reduce LDL and raise HDL. Stressed importance of physical activity 5 times per week for 30 minutes per day.      Assessment & Plan:  LDL 84.  At goal.  Continue med.  Repeat Lipids 3 mos.      6. Vitamin D deficiency  Assessment & Plan:  Vit D 52.  At goal.  Will monitor.        7. Renal cell carcinoma, unspecified laterality  Comments:  8 years ago diagnosed;radiation;sees Urologist.    8. Congestive heart failure, unspecified HF chronicity, unspecified heart failure type  Comments:  10 yrs ago MI;had open heart  sx;sees Cardiology.    9. PAD (peripheral artery disease)  Comments:  Sees cardiology.         A comprehensive HEALTH RISK ASSESSMENT was completed today. Results are summarized below:    The following EMOTIONAL/SOCIAL CONCERNS were identified on today's screening for Social Isolation, Depression and Anxiety:  *Patient reports his health has LIMITED his SOCIAL INTERACTIONS. (During the past four weeks, has your physical and/or emotional health limited your social activities with family, friends, neighbors, or groups?: (!) Yes (emotional))  <repeat PHQ-9>   The following COGNITIVE FUNCTION CONCERNS were identified on today's screening:  *Patient reports others comment he FREQUENTLY REPEATS THINGS. (Do others tell you that you repeat questions/statements in the same day?: (!) Yes)  The following FUNCTIONAL AND/OR SAFETY CONCERNS were identified on today's screening for Physical Symptoms, Nutritional, Home Safety/Living Situation, Fall Risk, Activities of Daily Living, Independent Activities of Daily Living, Physical Activity,Timed Up and Go test and Whisper test::  *Patient reports recent HEARING/VISION DIFFICULTIES. (Have you noticed any hearing or vision difficulties?: (!) Yes (hearing))  *Patient reports POSSIBLE MEDICATION ADHERENCE CONCERNS. (Do you have trouble taking medicines the way you've been told to take them?: (!) Yes)  *Patient reports NO ROUTINE EXERCISE. (On average, how many days per week do you engage in moderate to strenuous exercise (like a brisk walk)?: (!) 0)  *Patient reports he NEEDS AMBULATION ASSISTANCE. (Do you use an assistance device to easily get around?: (!) Yes)( )  *Patient's WHISPER TEST was ABNORMAL. (Was the patient's Whisper test normal in both ears?: (!) No)    The patient reports NO OPIOID PRESCRIPTIONS. This was confirmed through medication reconciliation and the [facility] website.    The patient is NOT A TOBACCO USER.  The patient reports NO SIGNIFICANT ALCOHOL USE.     All Questions  regarding food, transportation or housing were not answered today.    The patient was asked and declined the use of a free .      Code Status  The definition and details of patient's code status was discussed in the visit today.  After that lengthy discussion, patient made a conscious decision to be DNR; no chest compressions and no ventilation.  That is to say, patient would like chest compressions and temporary bagging/intubation should patient go into cardiorespiratory arrest, until such time as patient can be stabilized and a prognosis reached.     Healthcare power of /living will  No documents exist a pointing a healthcare power-of- or establishing a living will.  Pertinent forms were discussed with patient during the visit today and given to her to fill out at home.  Patient will discuss these topics with his family and return the signed forms thereafter. Today patient designates his daughter, Spring Elliott, as his medical power of .    Provided patient with a 5-10 year written screening schedule and personal prevention plan. Recommendations were developed using the USPSTF age appropriate recommendations. Education, counseling, and referrals were provided as needed. After Visit Summary printed and given to patient, which includes a list of additional screenings\tests needed.    Follow up in about 3 months (around 1/9/2025) for follow up w/labs: CBC, CMP, Folate, B12, Peripheral Smear.. In addition to their scheduled follow up, the patient has also been instructed to follow up on as needed basis.     Future Appointments   Date Time Provider Department Center   1/9/2025  8:40 AM [facility]   1/14/2025 10:00 AM Marlon Alonso Sr., [facility]

## 2024-10-22 DIAGNOSIS — K40.90 UNILATERAL INGUINAL HERNIA WITHOUT OBSTRUCTION OR GANGRENE, RECURRENCE NOT SPECIFIED: Primary | ICD-10-CM

## 2024-11-15 DIAGNOSIS — E79.0 HYPERURICEMIA: ICD-10-CM

## 2024-11-15 RX ORDER — ALLOPURINOL 300 MG/1
TABLET ORAL
Qty: 45 TABLET | Refills: 3 | Status: SHIPPED | OUTPATIENT
Start: 2024-11-15

## 2025-01-14 PROCEDURE — 82746 ASSAY OF FOLIC ACID SERUM: CPT | Performed by: FAMILY MEDICINE

## 2025-01-15 ENCOUNTER — TELEPHONE (OUTPATIENT)
Dept: FAMILY MEDICINE | Facility: CLINIC | Age: OVER 89
End: 2025-01-15

## 2025-01-15 NOTE — TELEPHONE ENCOUNTER
Spoke with patient and went over lab work. Patient verbalized understanding and confirmed appt on the 21st.

## 2025-01-15 NOTE — TELEPHONE ENCOUNTER
----- Message from Daniel Hopkins MD sent at 1/15/2025  8:24 AM CST -----  Elevated B12.  No acute lab concerns.  We will discuss all results at the upcoming appointment.

## 2025-01-27 ENCOUNTER — OFFICE VISIT (OUTPATIENT)
Dept: FAMILY MEDICINE | Facility: CLINIC | Age: OVER 89
End: 2025-01-27
Payer: MEDICARE

## 2025-01-27 VITALS
BODY MASS INDEX: 25.85 KG/M2 | WEIGHT: 184.63 LBS | HEIGHT: 71 IN | DIASTOLIC BLOOD PRESSURE: 86 MMHG | RESPIRATION RATE: 20 BRPM | HEART RATE: 58 BPM | OXYGEN SATURATION: 96 % | TEMPERATURE: 98 F | SYSTOLIC BLOOD PRESSURE: 140 MMHG

## 2025-01-27 DIAGNOSIS — I50.9 CONGESTIVE HEART FAILURE, UNSPECIFIED HF CHRONICITY, UNSPECIFIED HEART FAILURE TYPE: ICD-10-CM

## 2025-01-27 DIAGNOSIS — I73.9 PAD (PERIPHERAL ARTERY DISEASE): ICD-10-CM

## 2025-01-27 DIAGNOSIS — D69.6 THROMBOCYTOPENIA, UNSPECIFIED: Primary | ICD-10-CM

## 2025-01-27 DIAGNOSIS — C64.9 RENAL CELL CARCINOMA, UNSPECIFIED LATERALITY: ICD-10-CM

## 2025-01-27 DIAGNOSIS — D63.8 ANEMIA IN OTHER CHRONIC DISEASES CLASSIFIED ELSEWHERE: ICD-10-CM

## 2025-01-27 DIAGNOSIS — N18.32 STAGE 3B CHRONIC KIDNEY DISEASE: ICD-10-CM

## 2025-01-27 DIAGNOSIS — R09.81 NASAL CONGESTION: ICD-10-CM

## 2025-01-27 DIAGNOSIS — R79.9 ABNORMAL SERUM TOTAL PROTEIN LEVEL: ICD-10-CM

## 2025-01-27 PROCEDURE — 99214 OFFICE O/P EST MOD 30 MIN: CPT | Mod: ,,, | Performed by: FAMILY MEDICINE

## 2025-01-27 RX ORDER — CHLORTHALIDONE 25 MG/1
25 TABLET ORAL
Qty: 90 TABLET | Refills: 1 | Status: SHIPPED | OUTPATIENT
Start: 2025-01-27

## 2025-01-27 RX ORDER — SODIUM BICARBONATE 650 MG/1
650 TABLET ORAL DAILY
Qty: 90 TABLET | Refills: 2 | Status: SHIPPED | OUTPATIENT
Start: 2025-01-27

## 2025-01-27 RX ORDER — FLUTICASONE PROPIONATE 50 MCG
1 SPRAY, SUSPENSION (ML) NASAL 2 TIMES DAILY
Qty: 16 ML | Refills: 11 | Status: SHIPPED | OUTPATIENT
Start: 2025-01-27 | End: 2026-01-27

## 2025-01-27 RX ORDER — LORATADINE 10 MG/1
10 TABLET ORAL DAILY
Qty: 30 TABLET | Refills: 0 | Status: SHIPPED | OUTPATIENT
Start: 2025-01-27 | End: 2025-02-26

## 2025-01-27 NOTE — ASSESSMENT & PLAN NOTE
Worsening macrocytosis  Slightly worsening anemia  B12 and folate normal   Iron low  Denies hematochezia or melena    Recommend iron supplementation Monday Wednesday Friday   Recommend fecal occult blood, patient declined  Repeat CBC and iron three-months

## 2025-01-27 NOTE — ASSESSMENT & PLAN NOTE
GFR 39, stable  Repeat CMP 3 mos.  Followed by Nephrology.      Follow renoprotective measures including Renal Diet (reduce intake of nuts, peanut butter, milk, cheese, dried beans, peas) and Low Sodium Diet (less than 2 grams per day).  Avoid NSAIDs (Aleve, Mobic, Celebrex, Ibuprofen, Advil, Toradol and Diclofenac). May take Tylenol as needed for headache/pain.  Stay well hydrated. Avoid alcohol and soda. Limit tea and coffee.

## 2025-01-27 NOTE — PROGRESS NOTES
Family Medicine    Patient ID: [id number]     Chief Complaint: Follow-up (Lab results )      HPI:     Constantine Gregory Sr. is a 89 y.o. male here today for repeat labs.      He was anemic on last visit but declined all labs so we added him to the labs for this visit.  B12 and folate were not deficient.  Slight iron-deficiency.  Macrocytosis.  Slightly decreased hemoglobin from last time.  Denies hematochezia and melena.    Past Medical History:   Diagnosis Date    Anemia     CAD (coronary artery disease)     Cataract     CHF (congestive heart failure)     CKD (chronic kidney disease) stage 3, GFR 30-59 ml/min     Cystoid macular edema     left eye    DJD (degenerative joint disease)     Elevated PSA     Glaucoma     History of cardioembolic cerebrovascular accident (CVA)     History of pulmonary embolus (PE)     History of recurrent deep vein thrombosis (DVT)     Hypertension     Hyperuricemia     Osteoporosis     PAD (peripheral artery disease)     Renal cell carcinoma     Venous stasis     Vitamin D deficiency         Past Surgical History:   Procedure Laterality Date    CARDIAC VALVE REPLACEMENT      CATARACT EXTRACTION Bilateral     CORONARY ARTERY BYPASS GRAFT      CVA Bleed  1998    EYE SURGERY      GLAUCOMA SURGERY Left     JOINT REPLACEMENT          Social History     Tobacco Use    Smoking status: Never    Smokeless tobacco: Never   Substance and Sexual Activity    Alcohol use: No     Alcohol/week: 0.0 standard drinks of alcohol    Drug use: No    Sexual activity: Not Currently        Current Outpatient Medications   Medication Instructions    allopurinoL (ZYLOPRIM) 300 MG tablet Take 1/2 tablet by mouth once daily    aspirin (ECOTRIN) 81 mg, Daily    atorvastatin (LIPITOR) 80 mg, Daily    chlorthalidone (HYGROTEN) 25 mg, Oral, Three times weekly    cyanocobalamin 5,000 mcg, Daily    doxazosin (CARDURA) 4 mg, Nightly    ELIQUIS 2.5 mg, 2 times daily    finasteride (PROSCAR) 5 mg, Daily    fluticasone  propionate (FLONASE) 50 mcg, Each Nostril, 2 times daily    folic acid (FOLVITE) 1 mg, Oral, Daily    furosemide (LASIX) 20 mg, Daily    latanoprost 0.005 % ophthalmic solution Nightly    lisinopriL (PRINIVIL,ZESTRIL) 20 mg, Oral, Daily    loratadine (CLARITIN) 10 mg, Oral, Daily    nitroGLYCERIN (NITROSTAT) 0.4 mg, Every 5 min PRN    SLOW  mg, Daily    sodium bicarbonate 650 mg, Oral, Daily, Rx for 10 gram daily    vitamin D (VITAMIN D3) 1,000 Units, Daily       Review of patient's allergies indicates:   Allergen Reactions    Iodinated contrast media Rash    Nsaids (non-steroidal anti-inflammatory drug)         Patient Care Team:  Marlon Alonso Sr., MD as PCP - General (Family Medicine)  Glenn Cameron MD as Consulting Physician (Nephrology)  Derek Singleton MD as Consulting Physician (Cardiology)  Marcio Moreno MD as Consulting Physician (Urology)  Judy Harding DPM as Consulting Physician (Podiatry)  Iker Overton MD as Consulting Physician (Ophthalmology)     Subjective:     Review of Systems   Constitutional:  Negative for activity change, appetite change, fever and unexpected weight change.   HENT:  Negative for congestion, dental problem, rhinorrhea and trouble swallowing.    Eyes:  Negative for visual disturbance.   Respiratory:  Negative for chest tightness and shortness of breath.    Cardiovascular:  Negative for chest pain, palpitations and leg swelling.   Gastrointestinal:  Negative for abdominal pain, blood in stool, constipation, diarrhea, nausea and vomiting.   Genitourinary:  Negative for difficulty urinating.   Musculoskeletal:  Negative for gait problem.   Skin:  Negative for rash and wound.   Neurological:  Negative for dizziness, syncope, speech difficulty, weakness and light-headedness.   Psychiatric/Behavioral:  Negative for confusion and suicidal ideas.        12 point review of systems conducted, negative except as stated in the history of present illness.  "See HPI for details.    Objective:     Visit Vitals  BP (!) 140/86 (BP Location: Right arm, Patient Position: Sitting)   Pulse (!) 58   Temp 98 °F (36.7 °C) (Temporal)   Resp 20   Ht 5' 11" (1.803 m)   Wt 83.7 kg (184 lb 9.6 oz)   SpO2 96%   BMI 25.75 kg/m²       Physical Exam  Vitals and nursing note reviewed.   Constitutional:       General: He is not in acute distress.     Appearance: Normal appearance. He is not ill-appearing, toxic-appearing or diaphoretic.   HENT:      Head: Normocephalic and atraumatic.      Right Ear: Tympanic membrane, ear canal and external ear normal. There is no impacted cerumen.      Left Ear: Tympanic membrane, ear canal and external ear normal. There is no impacted cerumen.      Nose: No congestion or rhinorrhea.      Mouth/Throat:      Pharynx: Oropharynx is clear. No oropharyngeal exudate or posterior oropharyngeal erythema.   Eyes:      General:         Right eye: No discharge.         Left eye: No discharge.      Extraocular Movements: Extraocular movements intact.      Conjunctiva/sclera: Conjunctivae normal.   Cardiovascular:      Rate and Rhythm: Normal rate and regular rhythm.      Heart sounds: No murmur heard.     No friction rub. No gallop.   Pulmonary:      Effort: Pulmonary effort is normal. No respiratory distress.      Breath sounds: Normal breath sounds.   Musculoskeletal:      Right lower leg: No edema.      Left lower leg: No edema.   Skin:     Capillary Refill: Capillary refill takes less than 2 seconds.   Neurological:      Mental Status: He is alert and oriented to person, place, and time.   Psychiatric:         Mood and Affect: Mood normal.         Behavior: Behavior normal.         Thought Content: Thought content normal.         Labs Reviewed:     Chemistry:  Lab Results   Component Value Date     01/14/2025    K 4.3 01/14/2025    BUN 37.1 (H) 01/14/2025    CREATININE 1.66 (H) 01/14/2025    EGFRNORACEVR 39 01/14/2025    GLUCOSE 81 01/14/2025    CALCIUM " "8.6 (L) 01/14/2025    ALKPHOS 50 01/14/2025    LABPROT 7.0 01/14/2025    ALBUMIN 3.1 (L) 01/14/2025    AST 25 01/14/2025    ALT 13 01/14/2025        No results found for: "HGBA1C", "MICROALBCREA"     Hematology:  Lab Results   Component Value Date    WBC 6.14 01/14/2025    HGB 11.5 (L) 01/14/2025    HCT 35.4 (L) 01/14/2025     (L) 01/14/2025       Lipid Panel:  Lab Results   Component Value Date    CHOL 126 03/23/2023    HDL 42 03/23/2023    TRIG 65 03/23/2023        Urine:  No results found for: "COLORUA", "APPEARANCEUA", "SGUA", "PHUA", "PROTEINUA", "GLUCOSEUA", "KETONESUA", "BLOODUA", "NITRITESUA", "LEUKOCYTESUR", "RBCUA", "WBCUA", "BACTERIA", "SQEPUA", "HYALINECASTS", "CREATRANDUR", "PROTEINURINE", "UPROTCREA"     Assessment:       ICD-10-CM ICD-9-CM   1. Thrombocytopenia, unspecified  D69.6 287.5   2. Renal cell carcinoma, unspecified laterality  C64.9 189.0   3. Congestive heart failure, unspecified HF chronicity, unspecified heart failure type  I50.9 428.0   4. PAD (peripheral artery disease)  I73.9 443.9   5. Stage 3b chronic kidney disease  N18.32 585.3   6. Anemia in other chronic diseases classified elsewhere  D63.8 285.29   7. Abnormal serum total protein level  R79.9 790.99   8. Nasal congestion  R09.81 478.19        Plan:     1. Thrombocytopenia, unspecified  Overview:  Chronic thrombocytopenia   Levels always above 100  No history of bruising or petechia    Assessment & Plan:  Repeat CBC every 3 months to monitor       Orders:  -     Lipid Panel; Future; Expected date: 04/27/2025  -     Hemoglobin A1C; Future; Expected date: 04/27/2025  -     TSH; Future; Expected date: 04/27/2025    2. Renal cell carcinoma, unspecified laterality  Overview:  Eight years ago.  Follows Urology.  Had radiation    Orders:  -     Lipid Panel; Future; Expected date: 04/27/2025  -     Hemoglobin A1C; Future; Expected date: 04/27/2025  -     TSH; Future; Expected date: 04/27/2025    3. Congestive heart failure, " unspecified HF chronicity, unspecified heart failure type  Overview:  Chlorthalidone 25 mg   Lisinopril 20 mg daily  Follows Cardiology    Assessment & Plan:  Continue following cardiology   No heart failure signs or symptoms today    Orders:  -     chlorthalidone (HYGROTEN) 25 MG Tab; Take 1 tablet (25 mg total) by mouth 3 (three) times a week.  Dispense: 90 tablet; Refill: 1  -     Lipid Panel; Future; Expected date: 04/27/2025  -     Hemoglobin A1C; Future; Expected date: 04/27/2025  -     TSH; Future; Expected date: 04/27/2025    4. PAD (peripheral artery disease)  Overview:  Follows Cardiology    Assessment & Plan:  Continue following cardiology  Asymptomatic today    Orders:  -     Lipid Panel; Future; Expected date: 04/27/2025  -     Hemoglobin A1C; Future; Expected date: 04/27/2025  -     TSH; Future; Expected date: 04/27/2025    5. Stage 3b chronic kidney disease  Overview:  Follows Nephrology      Assessment & Plan:  GFR 39, stable  Repeat CMP 3 mos.  Followed by Nephrology.      Follow renoprotective measures including Renal Diet (reduce intake of nuts, peanut butter, milk, cheese, dried beans, peas) and Low Sodium Diet (less than 2 grams per day).  Avoid NSAIDs (Aleve, Mobic, Celebrex, Ibuprofen, Advil, Toradol and Diclofenac). May take Tylenol as needed for headache/pain.  Stay well hydrated. Avoid alcohol and soda. Limit tea and coffee.      Orders:  -     sodium bicarbonate 650 MG tablet; Take 1 tablet (650 mg total) by mouth Daily. Rx for 10 gram daily  Dispense: 90 tablet; Refill: 2  -     Lipid Panel; Future; Expected date: 04/27/2025  -     Hemoglobin A1C; Future; Expected date: 04/27/2025  -     TSH; Future; Expected date: 04/27/2025    6. Anemia in other chronic diseases classified elsewhere  Overview:  History of anemia chronic renal failure, and of B12 and folate deficiency    Assessment & Plan:  Worsening macrocytosis  Slightly worsening anemia  B12 and folate normal   Iron low  Denies  hematochezia or melena    Recommend iron supplementation Monday Wednesday Friday   Recommend fecal occult blood, patient declined  Repeat CBC and iron three-months      Orders:  -     CBC Auto Differential; Future; Expected date: 04/27/2025  -     Comprehensive Metabolic Panel; Future; Expected date: 04/27/2025  -     Iron and TIBC; Future; Expected date: 04/27/2025  -     Ferritin; Future; Expected date: 04/27/2025  -     Lipid Panel; Future; Expected date: 04/27/2025  -     Hemoglobin A1C; Future; Expected date: 04/27/2025  -     TSH; Future; Expected date: 04/27/2025    7. Abnormal serum total protein level  Overview:  Decreased albumin, increased globulin   Denies night sweats, fevers, weight loss    Assessment & Plan:  First instance of abnormal ratio   Repeat CMP three-month   If abnormal again get SPEP and UPEP    Orders:  -     Lipid Panel; Future; Expected date: 04/27/2025  -     Hemoglobin A1C; Future; Expected date: 04/27/2025  -     TSH; Future; Expected date: 04/27/2025    8. Nasal congestion  Comments:  One-month nasal congestion.  Denies itchiness, watery eyes, fever, facial pain.  Contraindication to Atrovent with glaucoma.  We will try Flonase and Claritin.  Orders:  -     fluticasone propionate (FLONASE) 50 mcg/actuation nasal spray; 1 spray (50 mcg total) by Each Nostril route 2 (two) times a day.  Dispense: 16 mL; Refill: 11  -     loratadine (CLARITIN) 10 mg tablet; Take 1 tablet (10 mg total) by mouth once daily.  Dispense: 30 tablet; Refill: 0  -     Lipid Panel; Future; Expected date: 04/27/2025  -     Hemoglobin A1C; Future; Expected date: 04/27/2025  -     TSH; Future; Expected date: 04/27/2025         Declined all vaccines    Follow up in about 3 months (around 4/27/2025). In addition to their scheduled follow up, the patient has also been instructed to follow up on as needed basis.     Future Appointments   Date Time Provider Department Center   5/7/2025  8:40 AM LAB, [facility]   5/14/2025 10:40 AM PROVIDER, [facility]   10/9/2025  9:20 AM LAB, [facility]   10/20/2025 10:00 AM Marlon Alonso Sr., MD [facility] MD

## 2025-03-10 DIAGNOSIS — R09.81 NASAL CONGESTION: ICD-10-CM

## 2025-03-10 RX ORDER — LORATADINE 10 MG/1
10 TABLET ORAL DAILY
Qty: 30 TABLET | Refills: 0 | Status: SHIPPED | OUTPATIENT
Start: 2025-03-10 | End: 2025-04-09

## 2025-06-25 ENCOUNTER — TELEPHONE (OUTPATIENT)
Dept: FAMILY MEDICINE | Facility: CLINIC | Age: OVER 89
End: 2025-06-25
Payer: MEDICARE

## 2025-06-25 DIAGNOSIS — D52.9 ANEMIA DUE TO FOLIC ACID DEFICIENCY, UNSPECIFIED DEFICIENCY TYPE: ICD-10-CM

## 2025-06-25 RX ORDER — FOLIC ACID 1 MG/1
1 TABLET ORAL DAILY
Qty: 30 TABLET | Refills: 0 | Status: SHIPPED | OUTPATIENT
Start: 2025-06-25 | End: 2025-07-25

## 2025-06-25 NOTE — TELEPHONE ENCOUNTER
Copied from CRM #[id number]. Topic: Medications - Medication Refill  >> Jun 11, 2025  1:40 PM Minoo wrote:  Who Called: Constantine SLADE Bri Sr.    Refill or New Rx:Refill  RX Name and Strength:folic acid (FOLVITE) 1 MG tablet  How is the patient currently taking it? (ex. 1XDay):1xday  Is this a 30 day or 90 day RX:90 day  Local or Mail Order:ocal  List of preferred pharmacies on file (remove unneeded): [facility] - [place]      If different Pharmacy is requested, enter Pharmacy information here including location and phone number:    Ordering Provider:PCP      Preferred Method of Contact: Phone Call  Patient's Preferred Phone Number on File: [phone]   Best Call Back Number, if different:  Additional Information: